# Patient Record
Sex: MALE | Race: WHITE | Employment: FULL TIME | URBAN - METROPOLITAN AREA
[De-identification: names, ages, dates, MRNs, and addresses within clinical notes are randomized per-mention and may not be internally consistent; named-entity substitution may affect disease eponyms.]

---

## 2017-07-06 PROCEDURE — 81003 URINALYSIS AUTO W/O SCOPE: CPT | Performed by: FAMILY MEDICINE

## 2018-09-22 PROCEDURE — 81003 URINALYSIS AUTO W/O SCOPE: CPT | Performed by: FAMILY MEDICINE

## 2024-02-08 NOTE — H&P (VIEW-ONLY)
[facility]     CRISTIAN KURTZ DO     2/8/2024                                                    PRE-OPERATIVE  H&P     Mike Hughes is a 63 year old male who presents for a pre-operative physical exam.     Patient is to have total right hip replacement, to be done by Dr. Omar Behery at [facility] on 3/1/2024.      Patient with ongoing right hip pain since Fall 2019. Described having intermittent, throbbing \"annoying pain that restricted movement of his right LE.  No lasting improvement after completing PT May-June 2020.  Evaluated by Ortho 1/23/2024 - imaging and clinical exam consistent with advanced right hip osteoarthritis. Patient electing to move forward with surgical procedure.       Current Outpatient Medications   Medication Sig Dispense Refill   • Sildenafil Citrate 50 MG Oral Tab Take 1 tablet (50 mg total) by mouth daily as needed for Erectile Dysfunction. 24 tablet 0   • B Complex Vitamins (B COMPLEX 50) Oral Tab Take by mouth daily.     • Cholecalciferol (VITAMIN D) 1000 UNITS Oral Cap Take  by mouth.        Allergies:   Ampicillin              HIVES    Comment:Cerner Allergy Text Annotation: ampicillin  Bee Venom               SWELLING    Comment:Sting sites Bee/Wasps  Shellfish               NAUSEA AND VOMITING  Sulfa Antibiotics       HIVES   Past Medical History:   Diagnosis Date   • Alcoholism in remission (HCC) 4/10/2008   • Blood type O+ 11/24/2012   • Depression, remission since about 2011. 5/10/2007   • Diverticulosis 5/10/2005   • ED (erectile dysfunction) 5/10/2005   • Hypertriglyceridemia 5/10/2008   • Other and unspecified hyperlipidemia    • Routine adult health maintenance 5/10/2005   • Skew deviation of eye, right 5/10/2005      Past Surgical History:   Procedure Laterality Date   • APPENDECTOMY     • COLONOSCOPY,DIAGNOSTIC  10/28/2011    JOEY Prather, diverticula, hemorrhoids, repeat 2021.   • OTHER SURGICAL HISTORY      DENTAL   • OTHER SURGICAL HISTORY       EOM  CORRECTION   • OTHER SURGICAL HISTORY      UMBILICAL HERNIA-X   • OTHER SURGICAL HISTORY  1997    PARTIAL  COLECTOMY   D/T  PERF'D  DIVERTIC   • OTHER SURGICAL HISTORY  2012    SIGMOID  RESECTION (TICS)   • VASECTOMY        Family History   Problem Relation Age of Onset   • Psychiatric Father         ETHOHIC   • Heart Disorder Father         \"HEART\" IN 30'S   • Lipids Father    • Hypertension Brother    • Psychiatric Brother         RECOVERING  ETOHIC   • Lipids Brother    • Obesity Brother    • Neurological Disorder Brother         TREMORS   • Lipids Brother    • Obesity Brother    • Hypertension Brother    • Psychiatric Brother         RECOVERING ETOHIC   • Psychiatric Maternal Grandmother         ALZ. DIS.   • Cancer Maternal Grandfather         pOSSIBLY   • Heart Disorder Maternal Grandfather         MI   • Other (Other) Paternal Grandmother         CVA   • Heart Disorder Paternal Grandfather         ?MI   • Psychiatric Mother         1) HYPOCHONDRIAC  2) DEMENTIA   • Other (Other) Mother         OSTEOPORSIS   • Psychiatric Son         1) BIPOLAR,  2) RECOVERING ETOHIC   • Psychiatric Son         ANXIETY D/O   • Cancer Paternal Aunt         ?LUNG (NON-SMKOER)      Social History:   Social History    Tobacco Use      Smoking status: Never      Smokeless tobacco: Never      Tobacco comments: PIPE DECADES AGO.    Vaping Use      Vaping Use: Never used    Alcohol use: No      Alcohol/week: 0.0 standard drinks of alcohol      Comment: VERY RAER ETOH, SOCIALLY; SUUMERS RARE  NON-ETOH BEER. SOBER ETOHIC SINCE ABT 2006.    Drug use: No      Comment: IN COLLEGE, [employer].        REVIEW OF SYSTEMS:  GENERAL: feels well otherwise  SKIN: denies any unusual skin lesions  EYES: denies blurred vision or double vision; +glasses   HEENT: denies nasal congestion, sinus pain, or sore throat   LUNGS: denies exertional dyspnea   CARDIOVASCULAR: denies exertional chest pain   GI: denies abdominal pain  : denies  dysuria  MUSCULOSKELETAL: denies back pain  NEURO: denies headaches  PSYCHE: no depression, no anxiety  HEMATOLOGIC: denies hx of anemia  ENDOCRINE: denies thyroid history  ALL/ASTHMA: no allergic rhinitis; no asthma    EXAM:  /78   Pulse 50   Resp 12   Ht 6' (1.829 m)   Wt 202 lb (91.6 kg)   BMI 27.40 kg/m²     GENERAL: no apparent distress; well-developed   SKIN: no rashes, no suspicious lesions  HEENT: Oropharynx clear. Moist mucous membranes    EYES: PERRLA, EOMI, conjunctiva are clear  NECK: supple, no adenopathy, no bruits  CHEST: no chest tenderness  LUNGS: clear to auscultation bilaterally  CARDIO: regular rate, regular rhythm; +S1, S2; no murmur   GI: soft, non-tender, non-distended; no masses; +bowel sounds   MUSCULOSKELETAL: back is not tender, FROM of the back  EXTREMITIES: no cyanosis, clubbing or edema  NEURO: AAO x 3, CN 2-12 intact   PSYCH: Normal mood and affect. Answers questions appropriately. Pleasant. Normal speech. Appropriately dressed and groomed.      EKG: Sinus rhythm; HR 51 bpm; no ST changes; no QT prolongation; narrow QRS complexes; normal EKG     ASSESSMENT AND PLAN:  Mike Hughes is a 63 year old male who presents for a pre-operative physical exam.     Patient is to have total right hip replacement, to be done by Dr. Omar Behery at [facility] on 3/1/2024.      1. Preoperative examination  - Vitals acceptable  - Unremarkable physical exam  - EKG acceptable without acute changes   - Instructed patient to discontinue use of all over-the-counter supplements, vitamins, and NSAIDs at least 7 days prior to date of procedure   - Patient is cleared planned procedure     2. Primary osteoarthritis of right hip  - Patient electing to move forward with surgical procedure       This consult was sent back the referring physician, Dr. Omar Behery.      NOTE TO PATIENT: The 21st Century Cures Act makes clinical notes like these available to patients in the interest of transparency.  Clinical notes are medical documents used by physicians and care providers to communicate with each other. These documents include medical language and terminology, abbreviations, and treatment information that may sound technical and at times possibly unfamiliar. In addition, at times, the verbiage may appear blunt or direct. These documents are one tool providers use to communicate relevant information and clinical opinions of the care providers in a way that allows common understanding of the clinical context.

## 2024-02-27 ENCOUNTER — LAB ENCOUNTER (OUTPATIENT)
Dept: LAB | Facility: HOSPITAL | Age: 64
End: 2024-02-27
Attending: STUDENT IN AN ORGANIZED HEALTH CARE EDUCATION/TRAINING PROGRAM
Payer: COMMERCIAL

## 2024-02-27 DIAGNOSIS — Z01.818 PREOP TESTING: ICD-10-CM

## 2024-02-27 LAB
ANTIBODY SCREEN: NEGATIVE
RH BLOOD TYPE: POSITIVE
RH BLOOD TYPE: POSITIVE

## 2024-02-27 PROCEDURE — 87641 MR-STAPH DNA AMP PROBE: CPT

## 2024-02-27 PROCEDURE — 86901 BLOOD TYPING SEROLOGIC RH(D): CPT

## 2024-02-27 PROCEDURE — 86850 RBC ANTIBODY SCREEN: CPT

## 2024-02-27 PROCEDURE — 86900 BLOOD TYPING SEROLOGIC ABO: CPT

## 2024-02-28 LAB — MRSA DNA SPEC QL NAA+PROBE: NEGATIVE

## 2024-02-29 RX ORDER — DIPHENHYDRAMINE HYDROCHLORIDE 50 MG/ML
12.5 INJECTION INTRAMUSCULAR; INTRAVENOUS EVERY 4 HOURS PRN
OUTPATIENT
Start: 2024-02-29

## 2024-02-29 RX ORDER — ACETAMINOPHEN 500 MG
1000 TABLET ORAL EVERY 6 HOURS
OUTPATIENT
Start: 2024-02-29

## 2024-02-29 RX ORDER — CEFAZOLIN SODIUM/WATER 2 G/20 ML
2 SYRINGE (ML) INTRAVENOUS EVERY 8 HOURS
OUTPATIENT
Start: 2024-02-29 | End: 2024-03-01

## 2024-02-29 RX ORDER — ENEMA 19; 7 G/133ML; G/133ML
1 ENEMA RECTAL ONCE AS NEEDED
OUTPATIENT
Start: 2024-02-29

## 2024-02-29 RX ORDER — FAMOTIDINE 10 MG/ML
20 INJECTION, SOLUTION INTRAVENOUS 2 TIMES DAILY
OUTPATIENT
Start: 2024-02-29

## 2024-02-29 RX ORDER — POLYETHYLENE GLYCOL 3350 17 G/17G
17 POWDER, FOR SOLUTION ORAL DAILY PRN
OUTPATIENT
Start: 2024-02-29

## 2024-02-29 RX ORDER — PROCHLORPERAZINE EDISYLATE 5 MG/ML
5 INJECTION INTRAMUSCULAR; INTRAVENOUS EVERY 8 HOURS PRN
OUTPATIENT
Start: 2024-02-29

## 2024-02-29 RX ORDER — ONDANSETRON 2 MG/ML
4 INJECTION INTRAMUSCULAR; INTRAVENOUS EVERY 6 HOURS PRN
OUTPATIENT
Start: 2024-02-29

## 2024-02-29 RX ORDER — DIPHENHYDRAMINE HCL 25 MG
25 CAPSULE ORAL EVERY 4 HOURS PRN
OUTPATIENT
Start: 2024-02-29

## 2024-02-29 RX ORDER — KETOROLAC TROMETHAMINE 30 MG/ML
30 INJECTION, SOLUTION INTRAMUSCULAR; INTRAVENOUS EVERY 6 HOURS
OUTPATIENT
Start: 2024-02-29 | End: 2024-03-02

## 2024-02-29 RX ORDER — BISACODYL 10 MG
10 SUPPOSITORY, RECTAL RECTAL
OUTPATIENT
Start: 2024-02-29

## 2024-02-29 RX ORDER — SODIUM CHLORIDE 9 MG/ML
INJECTION, SOLUTION INTRAVENOUS CONTINUOUS
OUTPATIENT
Start: 2024-02-29

## 2024-02-29 RX ORDER — HYDROMORPHONE HYDROCHLORIDE 1 MG/ML
0.4 INJECTION, SOLUTION INTRAMUSCULAR; INTRAVENOUS; SUBCUTANEOUS EVERY 2 HOUR PRN
OUTPATIENT
Start: 2024-02-29

## 2024-02-29 RX ORDER — DIPHENHYDRAMINE HYDROCHLORIDE 50 MG/ML
25 INJECTION INTRAMUSCULAR; INTRAVENOUS ONCE AS NEEDED
OUTPATIENT
Start: 2024-02-29 | End: 2024-02-29

## 2024-02-29 RX ORDER — HYDROMORPHONE HYDROCHLORIDE 1 MG/ML
0.2 INJECTION, SOLUTION INTRAMUSCULAR; INTRAVENOUS; SUBCUTANEOUS EVERY 2 HOUR PRN
OUTPATIENT
Start: 2024-02-29

## 2024-02-29 RX ORDER — DOCUSATE SODIUM 100 MG/1
100 CAPSULE, LIQUID FILLED ORAL 2 TIMES DAILY
OUTPATIENT
Start: 2024-02-29

## 2024-02-29 RX ORDER — FAMOTIDINE 20 MG/1
20 TABLET, FILM COATED ORAL 2 TIMES DAILY
OUTPATIENT
Start: 2024-02-29

## 2024-02-29 RX ORDER — ASPIRIN 81 MG/1
81 TABLET ORAL 2 TIMES DAILY
OUTPATIENT
Start: 2024-02-29

## 2024-02-29 RX ORDER — SENNOSIDES 8.6 MG
17.2 TABLET ORAL NIGHTLY
OUTPATIENT
Start: 2024-02-29

## 2024-02-29 NOTE — DISCHARGE INSTRUCTIONS
HOME INSTRUCTIONS  [facility] HOME CARE INSTRUCTIONS: POST-OP ANESTHESIA  The medication that you received for sedation or general anesthesia can last up to 24 hours. Your judgment and reflexes may be altered, even if you feel like your normal self.      We Recommend:   Do not drive any motor vehicle or bicycle   Avoid mowing the lawn, playing sports, or working with power tools/applicances (power saws, electric knives or mixers)   That you have someone stay with you on your first night home   Do not drink alcohol or take sleeping pills or tranquilizers   Do not sign legal documents within 24 hours of your procedure   If you had a nerve block for your surgery, take extra care not to put any pressure on your arm or hand for 24 hours    It is normal:  For you to have a sore throat if you had a breathing tube during surgery (while you were asleep!). The sore throat should get better within 48 hours. You can gargle with warm salt water (1/2 tsp in 4 oz warm water) or use a throat lozenge for comfort  To feel muscle aches or soreness especially in the abdomen, chest or neck. The achy feeling should go away in the next 24 hours  To feel weak, sleepy or \"wiped out\". Your should start feeling better in the next 24 hours.   To experience mild discomforts such as sore lip or tongue, headache, cramps, gas pains or a bloated feeling in your abdomen.   To experience mild back pain or soreness for a day or two if you had spinal or epidural anesthesia.   If you had laparoscopic surgery, to feel shoulder pain or discomfort on the day of surgery.   For some patients to have nausea after surgery/anesthesia    If you feel nausea or experience vomiting:   Try to move around less.   Eat less than usual or drink only liquids until the next morning   Nausea should resolve in about 24 hours    If you have a problem when you are at home:    Call your surgeons office   Discharge Instructions: After Your Surgery  You’ve just had surgery. During  surgery, you were given medicine called anesthesia to keep you relaxed and free of pain. After surgery, you may have some pain or nausea. This is common. Here are some tips for feeling better and getting well after surgery.   Going home  Your healthcare provider will show you how to take care of yourself when you go home. They'll also answer your questions. Have an adult family member or friend drive you home. For the first 24 hours after your surgery:   Don't drive or use heavy equipment.  Don't make important decisions or sign legal papers.  Take medicines as directed.  Don't drink alcohol.  Have someone stay with you, if needed. They can watch for problems and help keep you safe.  Be sure to go to all follow-up visits with your healthcare provider. And rest after your surgery for as long as your provider tells you to.   Coping with pain  If you have pain after surgery, pain medicine will help you feel better. Take it as directed, before pain becomes severe. Also, ask your healthcare provider or pharmacist about other ways to control pain. This might be with heat, ice, or relaxation. And follow any other instructions your surgeon or nurse gives you.      Stay on schedule with your medicine.     Tips for taking pain medicine  To get the best relief possible, remember these points:   Pain medicines can upset your stomach. Taking them with a little food may help.  Most pain relievers taken by mouth need at least 20 to 30 minutes to start to work.  Don't wait till your pain becomes severe before you take your medicine. Try to time your medicine so that you can take it before starting an activity. This might be before you get dressed, go for a walk, or sit down for dinner.  Constipation is a common side effect of some pain medicines. Call your healthcare provider before taking any medicines such as laxatives or stool softeners to help ease constipation. Also ask if you should skip any foods. Drinking lots of fluids and  eating foods such as fruits and vegetables that are high in fiber can also help. Remember, don't take laxatives unless your surgeon has prescribed them.  Drinking alcohol and taking pain medicine can cause dizziness and slow your breathing. It can even be deadly. Don't drink alcohol while taking pain medicine.  Pain medicine can make you react more slowly to things. Don't drive or run machinery while taking pain medicine.  Your healthcare provider may tell you to take acetaminophen to help ease your pain. Ask them how much you're supposed to take each day. Acetaminophen or other pain relievers may interact with your prescription medicines or other over-the-counter (OTC) medicines. Some prescription medicines have acetaminophen and other ingredients in them. Using both prescription and OTC acetaminophen for pain can cause you to accidentally overdose. Read the labels on your OTC medicines with care. This will help you to clearly know the list of ingredients, how much to take, and any warnings. It may also help you not take too much acetaminophen. If you have questions or don't understand the information, ask your pharmacist or healthcare provider to explain it to you before you take the OTC medicine.   Managing nausea  Some people have an upset stomach (nausea) after surgery. This is often because of anesthesia, pain, or pain medicine, less movement of food in the stomach, or the stress of surgery. These tips will help you handle nausea and eat healthy foods as you get better. If you were on a special food plan before surgery, ask your healthcare provider if you should follow it while you get better. Check with your provider on how your eating should progress. It may depend on the surgery you had. These general tips may help:   Don't push yourself to eat. Your body will tell you when to eat and how much.  Start off with clear liquids and soup. They're easier to digest.  Next try semi-solid foods as you feel ready.  These include mashed potatoes, applesauce, and gelatin.  Slowly move to solid foods. Don’t eat fatty, rich, or spicy foods at first.  Don't force yourself to have 3 large meals a day. Instead eat smaller amounts more often.  Take pain medicines with a small amount of solid food, such as crackers or toast. This helps prevent nausea.  When to call your healthcare provider  Call your healthcare provider right away if you have any of these:   You still have too much pain, or the pain gets worse, after taking the medicine. The medicine may not be strong enough. Or there may be a complication from the surgery.  You feel too sleepy, dizzy, or groggy. The medicine may be too strong.  Side effects such as nausea or vomiting. Your healthcare provider may advise taking other medicines to .  Skin changes such as rash, itching, or hives. This may mean you have an allergic reaction. Your provider may advise taking other medicines.  The incision looks different (for instance, part of it opens up).  Bleeding or fluid leaking from the incision site, and weren't told to expect that.  Fever of 100.4°F (38°C) or higher, or as directed by your provider.  Call 911  Call 911 right away if you have:   Trouble breathing  Facial swelling    If you have obstructive sleep apnea   You were given anesthesia medicine during surgery to keep you comfortable and free of pain. After surgery, you may have more apnea spells because of this medicine and other medicines you were given. The spells may last longer than normal.    At home:  Keep using the continuous positive airway pressure (CPAP) device when you sleep. Unless your healthcare provider tells you not to, use it when you sleep, day or night. CPAP is a common device used to treat obstructive sleep apnea.  Talk with your provider before taking any pain medicine, muscle relaxants, or sedatives. Your provider will tell you about the possible dangers of taking these medicines.  Contact your  provider if your sleeping changes a lot even when taking medicines as directed.  [employer]ern[employer] last reviewed this educational content on 10/1/2021  © 4760-1192 [employer]. All rights reserved. This information is not intended as a substitute for professional medical care. Always follow your healthcare professional's instructions.    DISCHARGE INSTRUCTIONS:  PLACE ICE TO SURGICAL AREA 20-30 MINUTES ON/ 20-30 MINUTES OFF. THIS IS TO HELP WITH PAIN & SWELLING.    TAKE YOUR MEDICATIONS AS PRESCRIBED BY YOUR DOCTOR BELOW.THESE HAVE BEEN SENT TO YOUR PHARMACY.    IF YOU WERE INSTRUCTED BY PHYSICAL THERAPY TO EXERCISE HIP PRECAUTIONS, CONTINUE TO DO SO AFTER DISCHARGE    IT IS OK TO BEAR WEIGHT AS TOLERATED ON THE OPERATIVE EXTREMITY.     CALL TO CONFIRM YOUR FOLLOW UP APPOINTMENT WITH DR. BEHERY TO BE SEEN IN 2-3 WEEKS POSTOP. [phone]    MEDICATIONS    POST OP MEDICATION REGIMEN              MULTI-MODAL   PAIN REGIMEN    *Take 1st dose upon arrival home. Meloxicam can begin tomorrow*   DRUG   FOR   FREQUENCY & DURATION   QTY   NOTES     WEANING  TIPS       Gabapentin 100mg   Nerve pain   Take 2 tabs every 8 hours for 14 days    90   No refills You may discontinue this medication prior to 14 days if you do not tolerate it.        Tylenol 500mg     Mild pain   Take 2 tabs every 6 hours     90   Can purchase over-the-counter    Stop using medication if no longer having pain.      Tramadol 50mg     Moderate pain   Take 1-2 tabs every 6 hours only as needed   45 May prescribe a refill, but ideally, this should be tapered off after surgery Stop using this medication 2nd   As pain decreases over time, increase the interval between doses (6 hours to 8, then 12, then 24) to taper off the medication.      Meloxicam 15mg     Inflammation   Take 1 tab daily for 30 days     30   May refill if needed beyond 30 days   Recommend completing the 30 day supply.           BREAKTHROUGH PAIN         Oxycodone 5mg       Severe  pain     Take 1-2 tabs every 4-6 hours only as needed for severe pain       40   Take at least 1 hr apart from Tramadol.    Ideally, no refill. The goal is to taper off this medication as soon as possible, as it can be addictive and have side effects.    Stop using this medication 1st if no longer having severe pain.         BLOOD THINNER  *1st dose after surgery at 6pm*   Aspirin 81mg   Blood clot prevention   Take 1 tab twice daily for 30 days     60     No refills   Complete the entire course of your blood thinner.         ANTIBIOTIC  *1st dose after surgery at 6pm*     Cefadroxil 500mg       Infection prevention     Take 1 tab twice daily for 7 days     14     No refills   Complete the entire course of your prophylactic antibiotic.           STOOL SOFTENER     Sennokot 8.6/50mg   Constipation   Take 1 tab twice daily  while on opioids     60 Refer to discharge instructions if constipation persists even after taking this medication   Stop taking if having diarrhea or loose stools.           ANTI-NAUSEA   Ondansetron 4mg   Nausea   Take 1 tab every 8 hours as needed if nauseous     20   No Refill      ANTI-ACID REFLUX / GASTRITIS   Pantoprazole 40mg   Acid reflux, gastric ulcer prophylaxis   Take 1 tab every day along with Meloxicam     60   May refill if Meloxicam refilled        DRESSING:    YOU HAVE A SPECIAL DRESSING CALLED AN AQUACEL DRESSING OVER YOUR INCISION.    THE DRESSING STAYS FOR 12 DAYS FROM SURGERY.    YOU MAY SHOWER AS SOON AS YOU RETURN HOME WITH THE AQUACEL DRESSING INTACT OVER YOUR INCISION AREA.    IF THE DRESSING LEAKS OR COMES LOOSE BEFORE THE 7 DAY PERIOD CONTACT YOUR DOCTOR / SURGEON.    AFTER   12 DAYS THE DRESSING IS REMOVED BY PULLING ON THE EDGE OF THE DRESSING AND GENTLY STRETCHING IT, THEN PEEL IT OFF SLOWLY LIKE A BANDAID. IF YOU HAVE ANY QUESTIONS OR CONCERNS ABOUT THE DRESSING CONTACT YOUR DOCTOR.    IF DRAINAGE IS PRESENT AT ANYTIME FROM YOUR DRESSING OR FROM YOUR INCISION CALL  YOUR DOCTOR / SURGEON AS SOON AS POSSIBLE.      REASONS TO CALL YOUR DOCTOR:  TEMPERATURE .0 OR MORE  PERSISTANT NAUSEA OR VOMITTING - ESPECIALLY IF YOU ARE UNABLE TO EAT OR DRINK.  INCREASED LEVEL OF PAIN OR PAIN WHICH IS NOT CONTROLLED BY YOUR PAIN MEDICINE.  PERSISTENT DRAINAGE AT ANYTIME FROM YOUR SURGICAL AREA / INCISION.  PAIN, TENDERNESS OR SUDDEN SWELLING IN YOUR CALF REGION OF THE LOWER EXTREMITIES - THIS IS A POSSIBLE SIGN OF A BLOOD CLOT.  ANY CHANGES IN SENSATION IN YOUR BODY IN THE AREA OF YOUR SURGERY = NUMBNESS OR TINGLING.  ANY CHANGES IN CIRCULATION IN YOUR BODY IN THE AREA OF YOUR SURGERY = CHANGES  IN COLOR EITHER DARKER OR VERY PALE; OR  IF AREA FEELS COLD TO THE TOUCH AS COMPARED TO OTHER AREAS.  ANY CHANGES IN FUNCTION IN YOUR BODY IN THE AREA OF YOUR SURGERY = CHANGE IN MOVEMENT - UNABLE TO MOVE OR WIGGLE FINGERS, TOES OR FOOT OR ANY OTHER CHANGES IN NORMAL BODY FUNCTIONING.  FOR ANY OF THE ABOVE OR ANY OTHER QUESTIONS OR CONCERNS CALL YOUR DOCTOR/ SURGEON AS SOON AS POSSIBLE.      Omar Behery, MD MPH  Orthopaedic Surgeon, Adult Hip and Knee Reconstruction  [facility] at [address] [address]  Office: (P) [phone] (F) [phone]  Adminstrative Assistant: Sandra Rodriguez

## 2024-03-01 ENCOUNTER — APPOINTMENT (OUTPATIENT)
Dept: GENERAL RADIOLOGY | Facility: HOSPITAL | Age: 64
End: 2024-03-01
Attending: STUDENT IN AN ORGANIZED HEALTH CARE EDUCATION/TRAINING PROGRAM
Payer: COMMERCIAL

## 2024-03-01 ENCOUNTER — HOSPITAL ENCOUNTER (OUTPATIENT)
Facility: HOSPITAL | Age: 64
Setting detail: HOSPITAL OUTPATIENT SURGERY
Discharge: HOME | End: 2024-03-01
Attending: STUDENT IN AN ORGANIZED HEALTH CARE EDUCATION/TRAINING PROGRAM | Admitting: STUDENT IN AN ORGANIZED HEALTH CARE EDUCATION/TRAINING PROGRAM
Payer: COMMERCIAL

## 2024-03-01 ENCOUNTER — ANESTHESIA (OUTPATIENT)
Dept: SURGERY | Facility: HOSPITAL | Age: 64
End: 2024-03-01
Payer: COMMERCIAL

## 2024-03-01 ENCOUNTER — ANESTHESIA EVENT (OUTPATIENT)
Dept: SURGERY | Facility: HOSPITAL | Age: 64
End: 2024-03-01
Payer: COMMERCIAL

## 2024-03-01 VITALS
OXYGEN SATURATION: 100 % | HEART RATE: 56 BPM | DIASTOLIC BLOOD PRESSURE: 78 MMHG | RESPIRATION RATE: 16 BRPM | BODY MASS INDEX: 27.04 KG/M2 | WEIGHT: 204 LBS | TEMPERATURE: 98 F | HEIGHT: 73 IN | SYSTOLIC BLOOD PRESSURE: 130 MMHG

## 2024-03-01 DIAGNOSIS — Z01.818 PREOP TESTING: Primary | ICD-10-CM

## 2024-03-01 DIAGNOSIS — M16.11 PRIMARY OSTEOARTHRITIS OF RIGHT HIP: ICD-10-CM

## 2024-03-01 PROCEDURE — 88311 DECALCIFY TISSUE: CPT | Performed by: STUDENT IN AN ORGANIZED HEALTH CARE EDUCATION/TRAINING PROGRAM

## 2024-03-01 PROCEDURE — 97161 PT EVAL LOW COMPLEX 20 MIN: CPT

## 2024-03-01 PROCEDURE — 97535 SELF CARE MNGMENT TRAINING: CPT

## 2024-03-01 PROCEDURE — 97116 GAIT TRAINING THERAPY: CPT

## 2024-03-01 PROCEDURE — 76000 FLUOROSCOPY <1 HR PHYS/QHP: CPT | Performed by: STUDENT IN AN ORGANIZED HEALTH CARE EDUCATION/TRAINING PROGRAM

## 2024-03-01 PROCEDURE — 97165 OT EVAL LOW COMPLEX 30 MIN: CPT

## 2024-03-01 PROCEDURE — 72170 X-RAY EXAM OF PELVIS: CPT | Performed by: STUDENT IN AN ORGANIZED HEALTH CARE EDUCATION/TRAINING PROGRAM

## 2024-03-01 PROCEDURE — 0SR9039 REPLACEMENT OF RIGHT HIP JOINT WITH CERAMIC SYNTHETIC SUBSTITUTE, CEMENTED, OPEN APPROACH: ICD-10-PCS | Performed by: STUDENT IN AN ORGANIZED HEALTH CARE EDUCATION/TRAINING PROGRAM

## 2024-03-01 PROCEDURE — 88305 TISSUE EXAM BY PATHOLOGIST: CPT | Performed by: STUDENT IN AN ORGANIZED HEALTH CARE EDUCATION/TRAINING PROGRAM

## 2024-03-01 DEVICE — PINNACLE CANCELLOUS BONE SCREW 6.5MM X 40MM
Type: IMPLANTABLE DEVICE | Site: HIP | Status: FUNCTIONAL
Brand: PINNACLE

## 2024-03-01 DEVICE — ACTIS DUOFIX HIP PROSTHESIS (FEMORAL STEM 12/14 TAPER CEMENTLESS SIZE 5 HIGH COLLAR)  CE
Type: IMPLANTABLE DEVICE | Site: HIP | Status: FUNCTIONAL
Brand: ACTIS

## 2024-03-01 DEVICE — PINNACLE CANCELLOUS BONE SCREW 6.5MM X 30MM
Type: IMPLANTABLE DEVICE | Site: HIP | Status: FUNCTIONAL
Brand: PINNACLE

## 2024-03-01 DEVICE — PINNACLE HIP SOLUTIONS ALTRX LD POLYETHYLENE ACETABULAR LINER +4 NEUTRAL 40MM ID 56MM OD
Type: IMPLANTABLE DEVICE | Site: HIP | Status: FUNCTIONAL
Brand: PINNACLE ALTRX

## 2024-03-01 DEVICE — BIOLOX DELTA TS CERAMIC FEMORAL HEAD 12/14 TAPER REVISION DIAMETER 40MM +1.5
Type: IMPLANTABLE DEVICE | Site: HIP | Status: FUNCTIONAL
Brand: BIOLOX DELTA

## 2024-03-01 DEVICE — PINNACLE GRIPTION ACETABULAR SHELL SECTOR 56MM OD
Type: IMPLANTABLE DEVICE | Site: HIP | Status: FUNCTIONAL
Brand: PINNACLE GRIPTION

## 2024-03-01 RX ORDER — ONDANSETRON 2 MG/ML
INJECTION INTRAMUSCULAR; INTRAVENOUS AS NEEDED
Status: DISCONTINUED | OUTPATIENT
Start: 2024-03-01 | End: 2024-03-01 | Stop reason: SURG

## 2024-03-01 RX ORDER — DEXAMETHASONE SODIUM PHOSPHATE 4 MG/ML
VIAL (ML) INJECTION AS NEEDED
Status: DISCONTINUED | OUTPATIENT
Start: 2024-03-01 | End: 2024-03-01 | Stop reason: SURG

## 2024-03-01 RX ORDER — NALOXONE HYDROCHLORIDE 0.4 MG/ML
80 INJECTION, SOLUTION INTRAMUSCULAR; INTRAVENOUS; SUBCUTANEOUS AS NEEDED
Status: DISCONTINUED | OUTPATIENT
Start: 2024-03-01 | End: 2024-03-01

## 2024-03-01 RX ORDER — HYDROMORPHONE HYDROCHLORIDE 1 MG/ML
0.6 INJECTION, SOLUTION INTRAMUSCULAR; INTRAVENOUS; SUBCUTANEOUS EVERY 5 MIN PRN
Status: DISCONTINUED | OUTPATIENT
Start: 2024-03-01 | End: 2024-03-01

## 2024-03-01 RX ORDER — OXYCODONE HYDROCHLORIDE 5 MG/1
5 TABLET ORAL EVERY 4 HOURS PRN
Status: DISCONTINUED | OUTPATIENT
Start: 2024-03-01 | End: 2024-03-01

## 2024-03-01 RX ORDER — OXYCODONE HYDROCHLORIDE 5 MG/1
2.5 TABLET ORAL EVERY 4 HOURS PRN
Status: DISCONTINUED | OUTPATIENT
Start: 2024-03-01 | End: 2024-03-01

## 2024-03-01 RX ORDER — ACETAMINOPHEN 500 MG
1000 TABLET ORAL ONCE
Status: COMPLETED | OUTPATIENT
Start: 2024-03-01 | End: 2024-03-01

## 2024-03-01 RX ORDER — SODIUM CHLORIDE, SODIUM LACTATE, POTASSIUM CHLORIDE, CALCIUM CHLORIDE 600; 310; 30; 20 MG/100ML; MG/100ML; MG/100ML; MG/100ML
INJECTION, SOLUTION INTRAVENOUS CONTINUOUS
Status: DISCONTINUED | OUTPATIENT
Start: 2024-03-01 | End: 2024-03-01

## 2024-03-01 RX ORDER — TRANEXAMIC ACID 10 MG/ML
INJECTION, SOLUTION INTRAVENOUS AS NEEDED
Status: DISCONTINUED | OUTPATIENT
Start: 2024-03-01 | End: 2024-03-01 | Stop reason: SURG

## 2024-03-01 RX ORDER — MORPHINE SULFATE 4 MG/ML
4 INJECTION, SOLUTION INTRAMUSCULAR; INTRAVENOUS EVERY 10 MIN PRN
Status: DISCONTINUED | OUTPATIENT
Start: 2024-03-01 | End: 2024-03-01

## 2024-03-01 RX ORDER — HYDROMORPHONE HYDROCHLORIDE 1 MG/ML
0.2 INJECTION, SOLUTION INTRAMUSCULAR; INTRAVENOUS; SUBCUTANEOUS EVERY 5 MIN PRN
Status: DISCONTINUED | OUTPATIENT
Start: 2024-03-01 | End: 2024-03-01

## 2024-03-01 RX ORDER — MORPHINE SULFATE 10 MG/ML
6 INJECTION, SOLUTION INTRAMUSCULAR; INTRAVENOUS EVERY 10 MIN PRN
Status: DISCONTINUED | OUTPATIENT
Start: 2024-03-01 | End: 2024-03-01

## 2024-03-01 RX ORDER — HYDROMORPHONE HYDROCHLORIDE 1 MG/ML
0.4 INJECTION, SOLUTION INTRAMUSCULAR; INTRAVENOUS; SUBCUTANEOUS EVERY 5 MIN PRN
Status: DISCONTINUED | OUTPATIENT
Start: 2024-03-01 | End: 2024-03-01

## 2024-03-01 RX ORDER — LIDOCAINE HYDROCHLORIDE 10 MG/ML
INJECTION, SOLUTION EPIDURAL; INFILTRATION; INTRACAUDAL; PERINEURAL AS NEEDED
Status: DISCONTINUED | OUTPATIENT
Start: 2024-03-01 | End: 2024-03-01 | Stop reason: SURG

## 2024-03-01 RX ORDER — BUPIVACAINE HYDROCHLORIDE 7.5 MG/ML
INJECTION, SOLUTION INTRASPINAL AS NEEDED
Status: DISCONTINUED | OUTPATIENT
Start: 2024-03-01 | End: 2024-03-01 | Stop reason: SURG

## 2024-03-01 RX ORDER — MORPHINE SULFATE 4 MG/ML
2 INJECTION, SOLUTION INTRAMUSCULAR; INTRAVENOUS EVERY 10 MIN PRN
Status: DISCONTINUED | OUTPATIENT
Start: 2024-03-01 | End: 2024-03-01

## 2024-03-01 RX ORDER — ACETAMINOPHEN 500 MG
1000 TABLET ORAL ONCE
Status: DISCONTINUED | OUTPATIENT
Start: 2024-03-01 | End: 2024-03-01

## 2024-03-01 RX ORDER — CEFAZOLIN SODIUM/WATER 2 G/20 ML
2 SYRINGE (ML) INTRAVENOUS ONCE
Status: COMPLETED | OUTPATIENT
Start: 2024-03-01 | End: 2024-03-01

## 2024-03-01 RX ADMIN — CEFAZOLIN SODIUM/WATER 2 G: 2 G/20 ML SYRINGE (ML) INTRAVENOUS at 08:38:00

## 2024-03-01 RX ADMIN — TRANEXAMIC ACID 1000 MG: 10 INJECTION, SOLUTION INTRAVENOUS at 09:33:00

## 2024-03-01 RX ADMIN — BUPIVACAINE HYDROCHLORIDE 1.5 ML: 7.5 INJECTION, SOLUTION INTRASPINAL at 08:36:00

## 2024-03-01 RX ADMIN — SODIUM CHLORIDE, SODIUM LACTATE, POTASSIUM CHLORIDE, CALCIUM CHLORIDE: 600; 310; 30; 20 INJECTION, SOLUTION INTRAVENOUS at 09:34:00

## 2024-03-01 RX ADMIN — TRANEXAMIC ACID 1000 MG: 10 INJECTION, SOLUTION INTRAVENOUS at 08:38:00

## 2024-03-01 RX ADMIN — ONDANSETRON 4 MG: 2 INJECTION INTRAMUSCULAR; INTRAVENOUS at 08:38:00

## 2024-03-01 RX ADMIN — LIDOCAINE HYDROCHLORIDE 50 MG: 10 INJECTION, SOLUTION EPIDURAL; INFILTRATION; INTRACAUDAL; PERINEURAL at 08:35:00

## 2024-03-01 RX ADMIN — DEXAMETHASONE SODIUM PHOSPHATE 10 MG: 4 MG/ML VIAL (ML) INJECTION at 08:38:00

## 2024-03-01 NOTE — ANESTHESIA PREPROCEDURE EVALUATION
Anesthesia PreOp Note    HPI:     Mike Hughes is a 63 year old male who presents for preoperative consultation requested by: Behery, Omar Atef, MD    Date of Surgery: 3/1/2024    Procedure(s):  Total anterior right hip arthroplasty  Indication: Right Hip Osteoarthritis M16.11    Relevant Problems   No relevant active problems       NPO:  Last Liquid Consumption Date: 02/29/24  Last Liquid Consumption Time: 1900  Last Solid Consumption Date: 02/29/24  Last Solid Consumption Time: 1800  Last Liquid Consumption Date: 02/29/24          History Review:  Patient Active Problem List    Diagnosis Date Noted    GXT 2020 08/16/2015    Umbilical hernia 07/11/2013    Blood type O+ 11/24/2012    Vitamin D deficiency 05/10/2012    Hypertriglyceridemia 05/10/2008    History of alcoholism (HCC) 04/10/2008    Depression, remission since about 2011. 05/10/2007    Skew deviation of eye, right 05/10/2005    ED (erectile dysfunction) 05/10/2005       Past Medical History:   Diagnosis Date    Alcoholism in remission (HCC) 04/10/2008    Blood type O+ 11/24/2012    Depression, remission since about 2011. 05/10/2007    Diverticulosis 05/10/2005    ED (erectile dysfunction) 05/10/2005    Hypertriglyceridemia 05/10/2008    Other and unspecified hyperlipidemia     Routine adult health maintenance 05/10/2005    Skew deviation of eye, right 05/10/2005    Visual impairment     prescriptioin eye glasses       Past Surgical History:   Procedure Laterality Date    APPENDECTOMY      COLONOSCOPY,DIAGNOSTIC  10/28/2011    T. Crescencio, diverticula, hemorrhoids, repeat 2021.    HERNIA SURGERY      OTHER SURGICAL HISTORY      DENTAL    OTHER SURGICAL HISTORY      EOM  CORRECTION    OTHER SURGICAL HISTORY      UMBILICAL HERNIA-X    OTHER SURGICAL HISTORY  01/01/1997    PARTIAL  COLECTOMY   D/T  PERF'D  DIVERTIC    OTHER SURGICAL HISTORY  01/01/2012    SIGMOID  RESECTION (TICS)    VASECTOMY         Medications Prior to Admission   Medication Sig Dispense  Refill Last Dose    Sildenafil Citrate 50 MG Oral Tab Take 1 tablet (50 mg total) by mouth daily as needed for Erectile Dysfunction. 24 tablet 3 2/12/2024    B Complex Vitamins (B COMPLEX 50) Oral Tab Take by mouth daily.   2/24/2024    Cholecalciferol (VITAMIN D) 1000 UNITS Oral Cap Take  by mouth.   2/24/2024     Current Facility-Administered Medications Ordered in Epic   Medication Dose Route Frequency Provider Last Rate Last Admin    lactated ringers infusion   Intravenous Continuous Behery, Omar Atef, MD 20 mL/hr at 03/01/24 0712 New Bag at 03/01/24 0712    ceFAZolin (Ancef) 2 g in 20mL IV syringe premix  2 g Intravenous Once Danielito Arguelles PA-C        clonidine-EPINEPHrine-ropivacaine-ketorolac (CERTS) (Duraclon-Adrenalin-Naropin-Toradol) pain cocktail irrigation   Intra-articular Once (Intra-Op) Behery, Omar Atef, MD        povidone-iodine (Betadine) 10 % 17.5 mL in sodium chloride 0.9 % 500 mL irrigation   Irrigation Once (Intra-Op) Behery, Omar Atef, MD         No current [facility]-ordered outpatient medications on file.       Allergies   Allergen Reactions    Sulfa Antibiotics HIVES    Penicillins OTHER (SEE COMMENTS)     Reaction to synthetic penicillins a long time ago.     Shellfish NAUSEA AND VOMITING       Family History   Problem Relation Age of Onset    Psychiatric Father         ETHOHIC    Heart Disorder Father         \"HEART\" IN 30'S    Lipids Father     Psychiatric Mother         1) HYPOCHONDRIAC  2) DEMENTIA    Other (Other) Mother         OSTEOPORSIS    Psychiatric Son         1) BIPOLAR,  2) RECOVERING ETOHIC    Psychiatric Son         ANXIETY D/O    Psychiatric Maternal Grandmother         ALZ. DIS.    Cancer Maternal Grandfather         pOSSIBLY    Heart Disorder Maternal Grandfather         MI    Other (Other) Paternal Grandmother         CVA    Heart Disorder Paternal Grandfather         ?MI    Hypertension Brother     Psychiatric Brother         RECOVERING  ETOHIC    Lipids Brother      Obesity Brother     Neurological Disorder Brother         TREMORS    Lipids Brother     Obesity Brother     Hypertension Brother     Psychiatric Brother         RECOVERING ETOHIC    Cancer Paternal Aunt         ?LUNG (NON-SMKOER)     Social History     Socioeconomic History    Marital status:      Spouse name: ARNOLD    Number of children: 2    Years of education: COLLEGE   Occupational History    Occupation: [employer]     Comment: [employer]   Tobacco Use    Smoking status: Never    Smokeless tobacco: Never    Tobacco comments:     PIPE DECADES AGO.   Vaping Use    Vaping Use: Never used   Substance and Sexual Activity    Alcohol use: No     Alcohol/week: 0.0 standard drinks of alcohol     Comment: VERY RAER ETOH, SOCIALLY; SUUMERS RARE  NON-ETOH BEER. SOBER ETOHIC SINCE ABT 2006.    Drug use: No     Comment: IN COLLEGE, [employer].    Sexual activity: Yes     Partners: Female   Other Topics Concern     Service No    Blood Transfusions No    Caffeine Concern Yes     Comment: 1-4 CUPS TEA, TWO CUPS  DECAF. COFFEE WITH ERG Q FEW DAYS, PER DAY.    Special Diet No    Exercise Yes     Comment: WALKS, YARD WORK, SWIMS.    Seat Belt Yes       Available pre-op labs reviewed.             Vital Signs:  Body mass index is 26.91 kg/m².   height is 1.854 m (6' 1\") and weight is 92.5 kg (204 lb). His oral temperature is 98 °F (36.7 °C). His blood pressure is 138/67 and his pulse is 52. His respiration is 16 and oxygen saturation is 94%.   Vitals:    02/26/24 1641 03/01/24 0649   BP:  138/67   Pulse:  52   Resp:  16   Temp:  98 °F (36.7 °C)   TempSrc:  Oral   SpO2:  94%   Weight: 93 kg (205 lb) 92.5 kg (204 lb)   Height: 1.854 m (6' 1\") 1.854 m (6' 1\")        Anesthesia Evaluation     Patient summary reviewed and Nursing notes reviewed    No history of anesthetic complications   Airway   Mallampati: II  Neck ROM: full  Dental      Pulmonary - negative ROS and normal  exam   Cardiovascular - negative ROS and normal exam    Neuro/Psych - negative ROS     GI/Hepatic/Renal - negative ROS     Endo/Other - negative ROS   Abdominal  - normal exam                 Anesthesia Plan:   ASA:  2  Plan:   MAC and spinal  Informed Consent Plan and Risks Discussed With:  Patient  Discussed plan with:  CRNA      I have informed Mike RITA Saul and/or legal guardian or family member of the nature of the anesthetic plan, benefits, risks including possible dental damage if relevant, major complications, and any alternative forms of anesthetic management.   All of the patient's questions were answered to the best of my ability. The patient desires the anesthetic management as planned.  ODETTE JOHN MD  3/1/2024 8:22 AM  Present on Admission:  **None**

## 2024-03-01 NOTE — OCCUPATIONAL THERAPY NOTE
OCCUPATIONAL THERAPY EVALUATION - INPATIENT     [facility]  Evaluation Date: 3/1/2024  Type of Evaluation: Initial  Presenting Problem: R ETHEL    Physician Order: IP Consult to Occupational Therapy  Reason for Therapy: ADL/IADL Dysfunction and Discharge Planning    OCCUPATIONAL THERAPY ASSESSMENT   Patient is a 63 year old male admitted 3/1/2024 for R ETHEL.  Prior to admission, patient's baseline is independent.  Patient is currently functioning near baseline with ADLs.  Patient is requiring supervision and minimal assist  (RLE dressing) as a result of the following impairments: decreased functional strength, impaired   balance, and limited RLE ROM. Occupational Therapy will sign off at this time as pt near baseline and does not have skilled inpatient OT needs.    Patient does not require skilled OT Services For duration of hospitalization, given the patient is functioning near baseline level do not anticipate skilled therapy needs at discharge     PLAN  Patient has been evaluated and presents with no skilled Occupational Therapy needs  at this time.  Patient will be discharged from Occupational Therapy services. Please re-order if a new functional limitation presents during this admission.  OT Device Recommendations: Raised toilet seat; Reacher; Sock aid; Long-handled shoehorn    OCCUPATIONAL THERAPY MEDICAL/SOCIAL HISTORY   Problem List  Active Problems:    * No active hospital problems. *    HOME SITUATION  Type of Home: House  Home Layout: Two level  Lives With: Spouse  Toilet and Equipment: Standard height toilet  Shower/Tub and Equipment: Walk-in shower  Drives: Yes  Patient Regularly Uses: None    Prior Level of [place]: independent    SUBJECTIVE  \"My bottom is numb, it feels so weird\"    OCCUPATIONAL THERAPY EXAMINATION    OBJECTIVE  Precautions: Limb alert - right  Fall Risk: Standard fall risk    WEIGHT BEARING RESTRICTION  R Lower Extremity: Weight Bearing as Tolerated    PAIN  ASSESSMENT  Ratin        COGNITION  Overall Cognitive Status:  WFL - within functional limits    VISION  Current Vision: wears glasses all the time    PERCEPTION  Overall Perception Status:   WFL - within functional limits    SENSATION  Reported bottom feels numb from block    Communication: WFL    Behavioral/Emotional/Social: WFL    RANGE OF MOTION   Upper extremity ROM is within functional limits     STRENGTH ASSESSMENT  Upper extremity strength is within functional limits     COORDINATION  Gross Motor: WFL   Fine Motor: WFL     ACTIVITIES OF DAILY LIVING ASSESSMENT  AM-PAC ‘6-Clicks’ Inpatient Daily Activity Short Form  How much help from another person does the patient currently need…  -   Putting on and taking off regular lower body clothing?: A Little  -   Bathing (including washing, rinsing, drying)?: A Little  -   Toileting, which includes using toilet, bedpan or urinal? : None  -   Putting on and taking off regular upper body clothing?: None  -   Taking care of personal grooming such as brushing teeth?: None  -   Eating meals?: None    AM-PAC Score:  Score: 22  Approx Degree of Impairment: 25.8%  Standardized Score (AM-PAC Scale): 47.1  CMS Modifier (G-Code): CJ    FUNCTIONAL TRANSFER ASSESSMENT  Sit to Stand: Edge of Bed  Edge of Bed: Contact Guard Assist  Toilet Transfer: Stand-by Assist    BED MOBILITY  Supine to Sit : Supervision  Sit to Supine (OT): Supervision    BALANCE ASSESSMENT  Static Sitting: Independent  Static Standing: Stand-by Assist    FUNCTIONAL ADL ASSESSMENT  Grooming Standing: Stand-by Assist (hand hygiene at sink)  UB Dressing Standing: Supervision (donning overhead shirt)  LB Dressing Seated: Minimal Assist (R sock, L sock, slip on shoes, underwear and pants SPV- mod I)  Toileting Seated: Modified Independent  Toileting Standing: Supervision (cues for FWW management, hand placement with functional transfers-unable to urinate with attempts at standing and sitting-RN  aware)      EDUCATION PROVIDED  Patient: Role of Occupational Therapy; Plan of Care; Discharge Recommendations; Adaptive Equipment Recommendations; Functional Transfer Techniques; Surgical Precautions; Weight Bear Status; Fall Prevention; Posture/Positioning; Compensatory ADL Techniques  Patient's Response to Education: Verbalized Understanding; Returned Demonstration  Family/Caregiver: Role of Occupational Therapy; Plan of Care; Discharge Recommendations; Adaptive Equipment Recommendations; Functional Transfer Techniques; Fall Prevention; Weight Bear Status; Surgical Precautions; Posture/Positioning; Compensatory ADL Techniques  Family/Caregiver's Response to Education: Verbalized Understanding; Returned Demonstration    The patient's Approx Degree of Impairment: 25.8% has been calculated based on documentation in the [facility] '6 clicks' Inpatient Daily Activity Short Form.  Research supports that patients with this level of impairment may benefit from intermittent assist.  Final disposition will be made by interdisciplinary medical team.    Patient End of Session: In bed;Needs met;Call light within reach;RN aware of session/findings;All patient questions and concerns addressed;Family present    Patient was able to achieve the following ...   Patient able to toilet transfer  safely and SPV- independently    Patient able to dress lower extremities  safely and min A RLE    Patient/Caregiver able to demonstrate safety with ADLS  safely and SPV-min A     Patient Evaluation Complexity Level:   Occupational Profile/Medical History LOW - Brief history including review of medical or therapy records    Specific performance deficits impacting engagement in ADL/IADL LOW  1 - 3 performance deficits    Client Assessment/Performance Deficits LOW - No comorbidities nor modifications of tasks    Clinical Decision Making LOW - Analysis of occupational profile, problem-focused assessments, limited treatment options    Overall  Complexity LOW     OT Session Time: 23 minutes  Self-Care Home Management: 13 minutes  10 min ramonita Harris, OTR/L

## 2024-03-01 NOTE — ANESTHESIA POSTPROCEDURE EVALUATION
Patient: Mike Hughes    Procedure Summary       Date: 03/01/24 Room / Location: [facility] MAIN OR [address] [place] [address] MAIN OR    Anesthesia Start: 0827 Anesthesia Stop:     Procedure: Total anterior right hip arthroplasty (Right: Hip) Diagnosis:       Primary osteoarthritis of right hip      (Right Hip Osteoarthritis M16.11)    Surgeons: Behery, Omar Atef, MD Anesthesiologist: Danielito Mario MD    Anesthesia Type: MAC, spinal ASA Status: 2            Anesthesia Type: MAC, spinal    Vitals Value Taken Time   /73 03/01/24 1033   Temp 98 °F (36.7 °C) 03/01/24 1033   Pulse 58 03/01/24 1033   Resp 16 03/01/24 1033   SpO2 98 % 03/01/24 1033       EM AN Post Evaluation:   Patient Evaluated in PACU  Patient Participation: complete - patient participated  Level of Consciousness: sleepy but conscious  Pain Score: 0  Pain Management: adequate  Airway Patency:patent  Dental exam unchanged from preop  Yes    Cardiovascular Status: acceptable  Respiratory Status: acceptable  Postoperative Hydration acceptable      Wes Lopez CRNA  3/1/2024 10:33 AM

## 2024-03-01 NOTE — INTERVAL H&P NOTE
Pre-op Diagnosis: Right Hip Osteoarthritis M16.11    The above referenced H&P was reviewed by Danielito Arguelles PA-C on 3/1/2024, the patient was examined and no significant changes have occurred in the patient's condition since the H&P was performed.  I discussed with the patient and/or legal representative the potential benefits, risks and side effects of this procedure; the likelihood of the patient achieving goals; and potential problems that might occur during recuperation.  I discussed reasonable alternatives to the procedure, including risks, benefits and side effects related to the alternatives and risks related to not receiving this procedure.  We will proceed with procedure as planned.

## 2024-03-01 NOTE — ANESTHESIA PROCEDURE NOTES
Spinal Block    Date/Time: 3/1/2024 8:27 AM    Performed by: Wes Lopez CRNA  Authorized by: Wes Lopez CRNA      General Information and Staff    Start Time:  3/1/2024 8:27 AM  End Time:  3/1/2024 8:36 AM  CRNA:  Wes Lopez CRNA  Performed by:  CRNA  Patient Location:  OR  Site identification: surface landmarks    Reason for Block: at surgeon's request and surgical anesthesia    Preanesthetic Checklist: patient identified, IV checked, risks and benefits discussed, monitors and equipment checked, pre-op evaluation, timeout performed, anesthesia consent and sterile technique used      Procedure Details    Patient Position:  Sitting  Prep: ChloraPrep and patient draped    Monitoring:  Continuous pulse ox and cardiac monitor  Approach:  Midline  Location:  L4-5  Injection Technique:  Single-shot    Needle    Needle Type:  Pencil-tip  Needle Gauge:  24 G    Assessment    Sensory Level:  T10  Events: clear CSF, CSF aspirated and well tolerated      Additional Comments

## 2024-03-01 NOTE — OPERATIVE REPORT
[facility]  OPERATIVE REPORT    DATE OF SURGERY: 3/1/2024    PREOPERATIVE DIAGNOSIS:   Right Hip Osteoarthritis    POST-OPERATIVE DIAGNOSIS:   Right Hip Osteoarthritis    PROCEDURE:  Right Total Hip Arthroplasty  Intra-operative Interpretation of Fluoroscopy    Location: [facility]    SURGEON: Omar A Behery, MD  Assistant(s): Danielito AVILES, Danielito Lerner MD    Anesthesia type:  Spinal  Estimated Blood Loss: 300cc    Drains: None    Findings: Consistent with advanced degenerative joint disease    Specimen: Femoral head    Complications: None immediately apparent    Implants:  Acetabular Component: Depuy [place], size 56mm gription sector shell, 40mm +4 Polyethylene Liner, two x 6.5mm dome screws  Femoral Component: Press-fit size 5, high offset, Depuy Actis femoral component  Head: 40mm + 1.5 delta ceramic head.     Indication:     This is a 63 year old man, who presented with chronic hip pain refractory to non-operative treatment, and impairing activities of daily living. Radiographic evaluation demonstrated hip osteoarthritis . Surgical versus non-surgical options were discussed with the patient, and together we decided it is best to proceed with a total hip arthroplasty with the goals of pain relief and improvement in function. All risks and benefits of surgery including bleeding, infection, dislocation, curry-prosthetic fracture, neurovascular injury, leg length or offset discrepancy, as well as medical and anesthesia-related complications were discussed with the patient / family, who elected to proceed with surgery.     Procedure in detail:    The patient was brought to the operating room, and underwent the above anesthesia.     The patient was placed in a supine position with both feet secured in boots on the Fulton table.  The operative hip was sterilely prepped and draped in a usual orthopedic fashion.      A surgical pause was conducted to reconfirm the correct patient, site,  side, and procedure to be performed.  Perioperative antibiotics were given within one hour prior to the procedure.      An approximately 8cm long incision was made beginning proximally 2 cm lateral and 2 cm distal to the anterior-superior iliac spine and extending distally toward the ipsilateral lateral epicondyle of the knee.  Electrocautery was used to dissect through the subcutaneous tissue. The fascia overlying the TFL was incised in line with its fibers.  A Meek elevator was used to separate the fascia from the TFL.  A finger then was placed along the superior femoral neck, and a cobra retractor was placed.  A cerebellar retractor was placed distally between the rectus and the tensor muscle.     Using electrocautery and a Schnidt tonsil clamp, the ascending branches of the lateral circumflex artery were ligated.  The investing fascia over the capsule was divided, and another cobra retractor was placed inferior to the femoral neck.  The pericapsular fat was then excised from around the anterior hip capsule.     The anterior hip capsule was then incised with electrocautery starting at the edge of the acetabulum in line with the anterosuperior edge of the femoral neck and extending distally to the anterior intertrochanteric line, dividing the capsule at about a 135-degree angle. The medial and lateral capsule flaps were elevated off the of the proximal femur intertrochanteric line, and No. 5 Ethibond tagging sutures were placed in the both flaps of the capsule. A Hohmann retractor was placed posterosuperiorly over the acetabulum and the capsule was elevated a few millimeters off of the acetabular rim and ilium. The hip was externally rotated to 90 degrees, allowing an inferomedial split of the capsule. The hip was rotated back and a double pronged retractor was placed inferomedially instead of the cobra.     The superior cobra was placed inside the capsule. A roosevelt was made on the femoral neck in line with the  planned femoral neck resection.  A reciprocating saw was used to complete the femoral neck osteotomy.  The hip was externally rotated to 70 degrees, and traction was applied. A spiral femoral hip screw was placed in the femoral head, and the femoral head was removed from the wound.      A No. 1 retractor over the anterior wall of the acetabulum and the No. 2 retractor was placed postero-superiorly.  This exposed the acetabulum. We then used electrocautery to excise the labrum and pulvinar. Fluoroscopy was utilized to obtain a perfect AP pelvis radiograph.  We then started reaming and sequentially expanded the socket. The last reamer was a 55-mm reamer.  Once appropriate reaming and positioning of the cup was determined with fluoroscopy, we then placed a final 56 mm acetabular shell in the appropriate degree of anteversion and abduction.  There was an excellent press fit.  Supplemental 6.5mm cancellous bone screws were drilled, measured and placed.  The final  40 mm +4 polyethylene acetabular liner was impacted into place without difficulty. The liner was checked and noted to be well seated.     Traction was released, and then we turned our attention to the femur. The femoral hook from the Sargeant bed was placed around the vastus ridge.  The leg was externally rotated 120 degrees. An asymmetric double pronged retractor was placed distally medially over the calcar, and the leg was then extended and adducted. A #1 retractor was placed posteriorly between the superolateral capsule and the gluteus minimus. The lateral capsule was then released off the femur, allowing for improved elevation of the femur. The conjoined and piriformis tendons were not released. The hook was elevated, a double pronged retractor was placed over the greater trochanter and appropriate exposure of the femur was achieved.     A box osteotome was used to identify the starting location. A rongeur was used to remove posterolateral cortical bone to  allow neutral broaching.  The femoral canal was then broached sequentially to a size 5 stem.  The broach was left in place, and a high offset neck with a 40-mm +1.5 trial head were placed.  The leg was abducted and raised, and the hip was then reduced. Fluoroscopic imaging demonstrated appropriate leg length and offset.  The hip was found to have appropriate soft tissue tension.  Hip range of motion was tested and noted to be stable throughout.    We then elected to implant these components.  The hip was re-dislocated.  The trial components were removed. The femur was exposed again, and the final size 5, high offset stem was impacted into place. The calcar was checked and noted to be intact without any evidence of fracture. A 40 mm + 1.5 femoral head was retrialed with appropriate soft tissue tension.  The final 40 mm + 1.5  femoral head was then impacted onto a cleaned/dried trunnion.  The hip was then reduced.     The wound was thoroughly irrigated with dilute betadine solution followed by normal saline. Pain cocktail injection was delivered to the soft tissues. The capsule was closed with No. 1 Vicryl interrupted sutures. The fascia over the TFL was closed with a running Quill suture.  The subcutaneous tissues were then closed with 2-0 vicryl.  The skin was closed with a running 3-0 monocryl suture.  Dermabond was applied to the skin edges and a dry sterile dressing was placed.  The patient was awakened from anesthesia and taken to the PACU in stable condition.  There were no immediate complications.       POST-OPERATIVE PLAN  The patient will be permitted weight bearing as tolerated on the operative extremity  The patient will be permitted range of motion as tolerated  The patient will receive 24 hours of perioperative antibiotics.  Venous thromboembolic prophylaxis will consist of early mobilization, mechanical compressive devices, and ASA.    The dressing may be removed at 12 days post-operatively  The patient  should be scheduled for follow up in 2 weeks for wound and radiographic evaluation.

## 2024-03-01 NOTE — PHYSICAL THERAPY NOTE
PHYSICAL THERAPY EVALUATION - INPATIENT     Room Number: [facility]  Evaluation Date: 3/1/2024  Type of Evaluation: Initial   Physician Order: PT Eval and Treat    Presenting Problem: primary osteoarthritis of R hip, s/p R anterior ETHEL     Reason for Therapy: Mobility Dysfunction and Discharge Planning    PHYSICAL THERAPY ASSESSMENT   Patient is a 63 year old male admitted 3/1/2024 for primary osteoarthritis of R hip, s/p R anterior ETHEL. Prior to admission, patient's baseline is Independent with ADLs/iADLs/functional mobility.  Patient is currently functioning near baseline with bed mobility, transfers, gait, and stair negotiation.  Patient is requiring supervision as a result of the following impairments: decreased functional strength, pain, impaired dynamic standing balance, decreased muscular endurance, and limited R hip ROM. Based on patient and family demonstrating good understanding of post operative protocol and safe functional mobility observed this date, no further skilled IP PT required at this time. RN made aware. Patient and family agreeable. Safe for DC to home from a PT standpoint.     Patient will benefit from continued skilled PT Services at discharge to promote functional independence and safety with additional support and return home with home health PT.    PLAN  Patient has been evaluated and presents with no skilled Physical Therapy needs at this time.  Patient will be discharged from Physical Therapy services. Please re-order if a new functional limitation presents during this admission.    PHYSICAL THERAPY MEDICAL/SOCIAL HISTORY     Problem List  Active Problems:    * No active hospital problems. *      HOME SITUATION  Home Situation  Type of Home: House  Home Layout: Two level  Stairs to Enter : 2 (platform steps)  Railing: No  Stairs to Bedroom: 14  Railing: Yes  Lives With: Spouse  Drives: Yes  Patient Owned Equipment: Crutches;Rolling walker (walking sticks, three wheeled  rollator)  Patient Regularly Uses: None     SUBJECTIVE  \"What should I do until therapy comes on Monday?\"    PHYSICAL THERAPY EXAMINATION   OBJECTIVE  Precautions: Limb alert - right  Fall Risk: Standard fall risk    WEIGHT BEARING RESTRICTION  Weight Bearing Restriction: R lower extremity  R Lower Extremity: Weight Bearing as Tolerated     PAIN ASSESSMENT  Rating: Unable to rate  Location: right hip  Management Techniques: Activity promotion;Body mechanics;Repositioning    COGNITION  Overall Cognitive Status:  WFL - within functional limits    RANGE OF MOTION AND STRENGTH ASSESSMENT  Upper extremity ROM and strength are within functional limits   Lower extremity ROM is within functional limits; R hip limited due to pain, WFL for functional mobility demonstrated   Lower extremity strength is within functional limits; RLE not formally assessed due to pain and recent surgery, approx 3/5    BALANCE  Static Sitting: Good  Dynamic Sitting: Fair +  Static Standing: Fair  Dynamic Standing: Fair -    NEUROLOGICAL FINDINGS  Sensation: groin residual numbness - improving    AM-PAC '6-Clicks' INPATIENT SHORT FORM - BASIC MOBILITY  How much difficulty does the patient currently have...  Patient Difficulty: Turning over in bed (including adjusting bedclothes, sheets and blankets)?: A Little   Patient Difficulty: Sitting down on and standing up from a chair with arms (e.g., wheelchair, bedside commode, etc.): A Little   Patient Difficulty: Moving from lying on back to sitting on the side of the bed?: A Little   How much help from another person does the patient currently need...   Help from Another: Moving to and from a bed to a chair (including a wheelchair)?: A Little   Help from Another: Need to walk in hospital room?: A Little   Help from Another: Climbing 3-5 steps with a railing?: A Little     AM-PAC Score:  Raw Score: 18   Approx Degree of Impairment: 46.58%   Standardized Score (AM-PAC Scale): 43.63   CMS Modifier  (G-Code): CK    FUNCTIONAL ABILITY STATUS  Functional Mobility/Gait Assessment  Gait Assistance: Supervision (Stand by assistance)  Distance (ft): 400  Assistive Device: Rolling walker  Pattern: R Decreased stance time;Shuffle (decreased hailey speed, decreased step length)  Stairs: Stairs  How Many Stairs: 4 x 2  Device: 2 Rails;1 Rail  Assist: Contact guard assist  Pattern: Ascend and Descend  Ascend and Descend : Step to (side step second trial)  Supine to Sit: supervision  Sit to Supine: supervision  Sit to Stand: supervision    Exercise/Education Provided:  Bed mobility  Body mechanics  Energy conservation  Functional activity tolerated  Gait training  Posture  Transfer training  Stair training   Patient educated on POC. Education on physiological benefits of mobility post operatively. Discussed ETHEL protocol, weight bearing status, precautions and education on therapeutic exercises.    The patient's Approx Degree of Impairment: 46.58% has been calculated based on documentation in the [facility] '6 clicks' Inpatient Basic Mobility Short Form.  Research supports that patients with this level of impairment may benefit from HHPT.  Final disposition will be made by interdisciplinary medical team.    RN approved activity. Patient received supine in bed with wife present in room; agreeable to participate in therapy on this date. Patient reporting R hip pain, not quantified per the pain scale.  Patient End of Session: In bed;Needs met;Call light within reach;RN aware of session/findings;All patient questions and concerns addressed;Family present    Patient was able to achieve the following ...   Patient able to transfer   Supervision, safe for home    Patient able to ambulate on level surfaces   Supervision with RW, safe for home     Patient Evaluation Complexity Level:  History Low - no personal factors and/or co-morbidities   Examination of body systems Low -  addressing 1-2 elements   Clinical Presentation Low- Stable    Clinical Decision Making  Low Complexity     Gait Trainin minutes

## (undated) DEVICE — SHEET,DRAPE,53X77,STERILE: Brand: MEDLINE

## (undated) DEVICE — NEEDLE SPNL 18GA L3.5IN PNK HUB SS RW FIT

## (undated) DEVICE — DRAPE SRG U 124X80IN U REINF

## (undated) DEVICE — SUTURE MCRYL SZ 3-0 L27IN ABSRB UD L24MM PS-1

## (undated) DEVICE — SUT MCRYL 2-0 36IN CT-1 ABSRB UD L36MM TAPR P

## (undated) DEVICE — VIOLET BRAIDED (POLYGLACTIN 910), SYNTHETIC ABSORBABLE SUTURE: Brand: COATED VICRYL

## (undated) DEVICE — SOLUTION IRRIG 3000ML 0.9% NACL FLX CONT

## (undated) DEVICE — HOOD, PEEL-AWAY: Brand: FLYTE

## (undated) DEVICE — ADHESIVE SKIN TOP FOR WND CLSR DERMBND ADV

## (undated) DEVICE — DRAPE PACK CHEST

## (undated) DEVICE — APPLICATOR SKIN PREP 26ML HI LT ORNG 2% CHG

## (undated) DEVICE — GAMMEX® PI HYBRID SIZE 8, STERILE POWDER-FREE SURGICAL GLOVE, POLYISOPRENE AND NEOPRENE BLEND: Brand: GAMMEX

## (undated) DEVICE — SYSTEM GRIPPER SELF RETRACTING

## (undated) DEVICE — 2T11 #2 PDO 36 X 36: Brand: 2T11 #2 PDO 36 X 36

## (undated) DEVICE — Device: Brand: STABLECUT®

## (undated) DEVICE — SYRINGE ONLY,20ML LUER LOCK: Brand: MEDLINE INDUSTRIES, INC.

## (undated) DEVICE — 1016 S-DRAPE IRRIG POUCH 10/BOX: Brand: STERI-DRAPE™

## (undated) DEVICE — GAMMEX® NON-LATEX PI ORTHO SIZE 8.5, STERILE POLYISOPRENE POWDER-FREE SURGICAL GLOVE: Brand: GAMMEX

## (undated) DEVICE — SUT ETHBND XL 2 30IN V-37 NABSRB GRN 40MM 1/2

## (undated) DEVICE — WOUND RETRACTOR AND PROTECTOR: Brand: ALEXIS O WOUND PROTECTOR-RETRACTOR

## (undated) DEVICE — ANTERIOR HIP: Brand: MEDLINE INDUSTRIES, INC.

## (undated) DEVICE — HOOD: Brand: FLYTE

## (undated) DEVICE — SOLUTION PREP 26ML 0.7% POVACRYLEX 74% ISO

## (undated) DEVICE — UNDYED BRAIDED (POLYGLACTIN 910), SYNTHETIC ABSORBABLE SUTURE: Brand: COATED VICRYL

## (undated) DEVICE — HANDPIECE SET WITH HIGH FLOW TIP AND SUCTION TUBE: Brand: INTERPULSE

## (undated) DEVICE — SOLUTION IRRIG 1000ML 0.9% NACL USP BTL

## (undated) DEVICE — DRESSING SURG W9XL25CM SIL SP CVR WTRPRF VIR